# Patient Record
Sex: MALE | Race: WHITE | NOT HISPANIC OR LATINO | Employment: FULL TIME | URBAN - METROPOLITAN AREA
[De-identification: names, ages, dates, MRNs, and addresses within clinical notes are randomized per-mention and may not be internally consistent; named-entity substitution may affect disease eponyms.]

---

## 2022-04-27 DIAGNOSIS — R13.10 DYSPHAGIA: ICD-10-CM

## 2022-04-27 DIAGNOSIS — K21.9 GERD (GASTROESOPHAGEAL REFLUX DISEASE): Primary | ICD-10-CM

## 2022-04-27 DIAGNOSIS — Z87.19 HISTORY OF ESOPHAGEAL STRICTURE: ICD-10-CM

## 2022-07-05 ENCOUNTER — NURSE TRIAGE (OUTPATIENT)
Dept: ADMINISTRATIVE | Facility: CLINIC | Age: 67
End: 2022-07-05
Payer: MEDICARE

## 2022-07-05 NOTE — TELEPHONE ENCOUNTER
OCATHERINE Rn  NOT IN ,  IN [place].  Patient calling c/o Friday and slipped and fell out of care during inspection of car.  fell on left wrist.  Went to . 7/2/22 splint is on it.  Left wrist is swollen.  Got xrays done. states no fracture and applied splint, pain getting worse Sunday and Monday, very painful. And sandi.  Has xray.    Instructed to consult Dr. Au. Gave him Dr. Childers number.  Wrist has laceration. Pain level today 8/10  Care advise is to see  Today.  He only wants to see PCP.He is in [place], Advised to see Dr. Must go to  /ED  To see Doctor. Could not complete triage.  Patient only wants to Dr. Au.    And disconnected call.      Reason for Disposition   SEVERE pain (e.g., excruciating)    Additional Information   Negative: Major bleeding (actively dripping or spurting) that can't be stopped   Negative: Amputation or bone sticking through the skin   Negative: Sounds like a life-threatening emergency to the triager   Negative: Bullet, stabbed by knife or other serious penetrating wound   Negative: High pressure injection injury (e.g., from paint gun, usually work-related)   Negative: Injury looks like a broken bone or dislocated joint (crooked or deformed)   Negative: Skin is split open or gaping  (length > 1/2 inch or 12 mm)   Negative: Bleeding won't stop after 10 minutes of direct pressure (using correct technique)   Negative: Dirt in the wound and not removed after 15 minutes of scrubbing   Negative: Sounds like a serious injury to the triager   Negative: Numbness (loss of sensation) of finger(s)   Negative: Looks infected (e.g., spreading redness, pus, red streak)    Protocols used: ST TRAUMA - HAND AND WRIST-A-OH

## 2022-07-07 ENCOUNTER — OFFICE VISIT (OUTPATIENT)
Dept: ORTHOPEDICS | Facility: CLINIC | Age: 67
End: 2022-07-07
Payer: MEDICARE

## 2022-07-07 DIAGNOSIS — M25.532 ACUTE PAIN OF LEFT WRIST: ICD-10-CM

## 2022-07-07 DIAGNOSIS — S63.502A SPRAIN OF LEFT WRIST, INITIAL ENCOUNTER: Primary | ICD-10-CM

## 2022-07-07 PROCEDURE — 99203 PR OFFICE/OUTPT VISIT, NEW, LEVL III, 30-44 MIN: ICD-10-PCS | Mod: S$GLB,,, | Performed by: ORTHOPAEDIC SURGERY

## 2022-07-07 PROCEDURE — 99203 OFFICE O/P NEW LOW 30 MIN: CPT | Mod: S$GLB,,, | Performed by: ORTHOPAEDIC SURGERY

## 2022-07-07 RX ORDER — MORPHINE SULFATE 30 MG/1
TABLET ORAL
COMMUNITY
End: 2023-12-07

## 2022-07-07 RX ORDER — DIAZEPAM 10 MG/1
TABLET ORAL
COMMUNITY
Start: 2022-06-30

## 2022-07-07 RX ORDER — DICLOFENAC SODIUM 75 MG/1
75 TABLET, DELAYED RELEASE ORAL 2 TIMES DAILY
Qty: 60 TABLET | Refills: 3 | Status: SHIPPED | OUTPATIENT
Start: 2022-07-07 | End: 2023-12-07

## 2022-07-07 NOTE — PROGRESS NOTES
Subjective:      Patient ID: Lazaro Duenas is a 66 y.o. male.    Chief Complaint: Pain of the Left Wrist    HPI 66-year-old man who fell on his outstretched left arm 6 days ago.  He comes in complaining of dorsal and radial wrist pain.  He was seen at urgent care where x-rays were taken which were interpreted as normal.  He was placed in a thumb spica wrist splint.    Review of Systems   Constitutional: Negative for fever and weight loss.   Cardiovascular: Negative for chest pain and leg swelling.   Musculoskeletal: Positive for joint pain, joint swelling and stiffness. Negative for arthritis and muscle weakness.   Gastrointestinal: Negative for change in bowel habit.   Genitourinary: Negative for bladder incontinence and hematuria.   Neurological: Negative for focal weakness, numbness, paresthesias and sensory change.         Objective:      Patient has diffuse dorsal wrist and hand swelling.  He is tender with palpation of the distal radius.  He has normal sensation and pulses.  He has 45° of extension and 40° of flexion.    Ortho/SPM Exam            Assessment:       Encounter Diagnoses   Name Primary?    Sprain of left wrist, initial encounter     Acute pain of left wrist Yes          Plan:       Lazaro was seen today for pain.    Diagnoses and all orders for this visit:    Acute pain of left wrist  -     X-Ray Wrist Navicular Views Left; Future    Sprain of left wrist, initial encounter    X-rays including navicular views are taken today and they are unremarkable.  The wrist spica splint is appropriate treatment.  He is also placed on Voltaren return 10 days p.r.n.

## 2022-07-19 ENCOUNTER — OFFICE VISIT (OUTPATIENT)
Dept: ORTHOPEDICS | Facility: CLINIC | Age: 67
End: 2022-07-19
Payer: MEDICARE

## 2022-07-19 VITALS — BODY MASS INDEX: 26.13 KG/M2 | HEIGHT: 71 IN | WEIGHT: 186.63 LBS

## 2022-07-19 DIAGNOSIS — M25.532 ACUTE PAIN OF LEFT WRIST: Primary | ICD-10-CM

## 2022-07-19 PROCEDURE — 99212 PR OFFICE/OUTPT VISIT, EST, LEVL II, 10-19 MIN: ICD-10-PCS | Mod: S$GLB,,, | Performed by: ORTHOPAEDIC SURGERY

## 2022-07-19 PROCEDURE — 99212 OFFICE O/P EST SF 10 MIN: CPT | Mod: S$GLB,,, | Performed by: ORTHOPAEDIC SURGERY

## 2022-07-19 RX ORDER — MORPHINE SULFATE 30 MG/1
TABLET, FILM COATED, EXTENDED RELEASE ORAL
COMMUNITY
Start: 2022-07-05

## 2022-07-19 NOTE — PROGRESS NOTES
Subjective:      Patient ID: Lazaro Duenas is a 66 y.o. male.    Chief Complaint: Pain of the Left Wrist    HPI patient comes in today for follow-up for acute pain of the left wrist.  He is significantly improved on his anti-inflammatories.    ROS unchanged from prior visit      Objective:      Active and passive range of motion is normal.  The swelling has resolved.       Ortho/SPM Exam            Assessment:       Encounter Diagnosis   Name Primary?    Acute pain of left wrist Yes          Plan:       Lazaro was seen today for pain.    Diagnoses and all orders for this visit:    Acute pain of left wrist

## 2023-06-28 DIAGNOSIS — G89.29 CHRONIC PAIN: Primary | ICD-10-CM

## 2023-06-28 DIAGNOSIS — M54.16 RADICULOPATHY, LUMBAR REGION: ICD-10-CM

## 2023-06-28 DIAGNOSIS — G60.9 IDIOPATHIC PERIPHERAL NEUROPATHY: ICD-10-CM

## 2023-08-15 ENCOUNTER — TELEPHONE (OUTPATIENT)
Dept: PAIN MEDICINE | Facility: CLINIC | Age: 68
End: 2023-08-15
Payer: MEDICARE

## 2023-08-15 NOTE — TELEPHONE ENCOUNTER
----- Message from Rayna Abdullahi MD sent at 8/11/2023  1:05 PM CDT -----    Please call the patient and explain:     1) I do not prescribe concurrent opioids (morphine)and benzodiazepine (diazepam) medications.  2) We are an interventional pain clinic that works with our patients to find the safest, most effective solutions for their pain.     3) We employ a multidisciplinary approach that maximizes function and minimizes narcotic usage.     4) If he would like an assessment for non-narcotic management like injections, physical therapy, and non-opioid medications, we are more than happy to see evaluate for this.       Thank you,  Rayna Abdullahi MD  Interventional Pain Medicine

## 2023-09-07 ENCOUNTER — OFFICE VISIT (OUTPATIENT)
Dept: PAIN MEDICINE | Facility: CLINIC | Age: 68
End: 2023-09-07
Payer: MEDICARE

## 2023-09-07 VITALS
WEIGHT: 181 LBS | BODY MASS INDEX: 25.91 KG/M2 | DIASTOLIC BLOOD PRESSURE: 80 MMHG | SYSTOLIC BLOOD PRESSURE: 134 MMHG | OXYGEN SATURATION: 95 % | HEIGHT: 70 IN | HEART RATE: 76 BPM

## 2023-09-07 DIAGNOSIS — M25.651 DECREASED RANGE OF MOTION OF BOTH HIPS: ICD-10-CM

## 2023-09-07 DIAGNOSIS — M25.671 DECREASED RANGE OF MOTION OF BOTH ANKLES: ICD-10-CM

## 2023-09-07 DIAGNOSIS — M43.16 SPONDYLOLISTHESIS OF LUMBAR REGION: ICD-10-CM

## 2023-09-07 DIAGNOSIS — T85.192D FAILURE OF SPINAL CORD STIMULATOR, SUBSEQUENT ENCOUNTER: ICD-10-CM

## 2023-09-07 DIAGNOSIS — M43.06 LUMBAR SPONDYLOLYSIS: ICD-10-CM

## 2023-09-07 DIAGNOSIS — M96.1 FAILED BACK SYNDROME OF LUMBAR SPINE: ICD-10-CM

## 2023-09-07 DIAGNOSIS — M47.816 LUMBAR SPONDYLOSIS: ICD-10-CM

## 2023-09-07 DIAGNOSIS — M54.50 CHRONIC BILATERAL LOW BACK PAIN WITHOUT SCIATICA: Primary | ICD-10-CM

## 2023-09-07 DIAGNOSIS — M25.672 DECREASED RANGE OF MOTION OF BOTH ANKLES: ICD-10-CM

## 2023-09-07 DIAGNOSIS — M54.16 RADICULOPATHY, LUMBAR REGION: ICD-10-CM

## 2023-09-07 DIAGNOSIS — G60.9 IDIOPATHIC PERIPHERAL NEUROPATHY: ICD-10-CM

## 2023-09-07 DIAGNOSIS — M25.652 DECREASED RANGE OF MOTION OF BOTH HIPS: ICD-10-CM

## 2023-09-07 DIAGNOSIS — G89.29 CHRONIC BILATERAL LOW BACK PAIN WITHOUT SCIATICA: Primary | ICD-10-CM

## 2023-09-07 DIAGNOSIS — M53.86 DECREASED RANGE OF MOTION OF LUMBAR SPINE: ICD-10-CM

## 2023-09-07 DIAGNOSIS — G89.4 CHRONIC PAIN SYNDROME: ICD-10-CM

## 2023-09-07 PROBLEM — E78.00 HIGH CHOLESTEROL: Status: ACTIVE | Noted: 2023-09-07

## 2023-09-07 PROBLEM — M51.9 LUMBAR DISC DISEASE: Status: ACTIVE | Noted: 2017-10-09

## 2023-09-07 PROBLEM — M54.9 BACK PAIN: Status: ACTIVE | Noted: 2017-03-28

## 2023-09-07 PROBLEM — K21.9 GERD (GASTROESOPHAGEAL REFLUX DISEASE): Status: ACTIVE | Noted: 2023-09-07

## 2023-09-07 PROBLEM — C18.9 MALIGNANT NEOPLASM OF COLON: Status: ACTIVE | Noted: 2018-01-11

## 2023-09-07 PROBLEM — Z79.891 CHRONIC PRESCRIPTION OPIATE USE: Status: ACTIVE | Noted: 2019-08-06

## 2023-09-07 PROBLEM — H91.90 HEARING LOSS: Status: ACTIVE | Noted: 2023-09-07

## 2023-09-07 PROCEDURE — 99205 OFFICE O/P NEW HI 60 MIN: CPT | Mod: S$GLB,,, | Performed by: PHYSICAL MEDICINE & REHABILITATION

## 2023-09-07 PROCEDURE — 99205 PR OFFICE/OUTPT VISIT, NEW, LEVL V, 60-74 MIN: ICD-10-PCS | Mod: S$GLB,,, | Performed by: PHYSICAL MEDICINE & REHABILITATION

## 2023-09-07 RX ORDER — PANTOPRAZOLE SODIUM 40 MG/1
TABLET, DELAYED RELEASE ORAL
COMMUNITY
Start: 2023-08-18

## 2023-09-07 RX ORDER — ATORVASTATIN CALCIUM 20 MG/1
TABLET, FILM COATED ORAL
COMMUNITY
Start: 2023-09-01

## 2023-09-07 NOTE — PROGRESS NOTES
[facility]      Referring Provider: Billy Chapman Md  [address]   [facility] [address]    Chief Complaint:   Chief Complaint   Patient presents with    Back Pain    Foot Pain     bilateral       History of Present Illness: Lazaro Duenas is a 67 y.o. male referred by Dr. Billy Chapman for Back Pain and Bilateral Foot Pain.      Onset: 2002, back pain started without clear inciting event. In 2013 he was just driving down the road and pain acutely worsened as Dr. Au, Dr. Christy predicted. He had back surgery at L5-S1 in May 2013 and another surgery in October 2013 with Dr. Santiago. He had a spinal cord stimulator (Biolase) placed with Dr. Luong in [place] in 2016 and that didn't help.   Location: Back bilaterally and diffusely   Radiation: Does not radiate down the leg, but gets pain in both feet in the balls of his feet.   Timing: constant  Quality: Burning, Hot, Tingling, Numb, Pins/Needles, Electric, and Stabbing  Exacerbating Factors: nothing in particular  Alleviating Factors: nothing  Associated Symptoms: he has weakness in both legs. He has numbness in both legs. He denies night fever/night sweats, urinary incontinence/change in function, bowel incontinence/change in function, and unexplained weight loss    Severity: Currently: 10/10   Typical Range: 10-10/10     Exacerbation: 10/10     P = 10  E = 10  G = 9  Baseline PEG Score = 9.67  Current PEG Score: 9.67    Opioid Risk Score         Value Time User    Opioid Risk Score  0 9/7/2023  2:00 PM Wilda Sanchez MA             Previous Interventions:  - Spinal Cord Stimulator ([employer])    Previous Therapies:  PT/OT: yes   Relevant Surgery: yes   - L5-S1 surgery and a 2nd surgery within same year in 2013 with Dr. Santiago  Previous Medications:   - NSAIDS:   - Muscle Relaxants:  valium  - TCAs:   - SNRIs: cymbalta  - Topicals:   - Anticonvulsants: gabapentin. lyrica   - Opioids:  "morphine.    Current Pain Medications:  Morphine ER 30 mg TID      Blood Thinners: none    Full Medication List:    Current Outpatient Medications:     atorvastatin (LIPITOR) 20 MG tablet, , Disp: , Rfl:     diazePAM (VALIUM) 10 MG Tab, , Disp: , Rfl:     morphine (MS CONTIN) 30 MG 12 hr tablet, TAKE ONE (1) TABLET(S) BY MOUTH THREE TIMES A DAY., Disp: , Rfl:     morphine (MSIR) 30 MG tablet, , Disp: , Rfl:     pantoprazole (PROTONIX) 40 MG tablet, , Disp: , Rfl:     diclofenac (VOLTAREN) 75 MG EC tablet, Take 1 tablet (75 mg total) by mouth 2 (two) times daily. (Patient not taking: Reported on 9/7/2023), Disp: 60 tablet, Rfl: 3     Review of Systems: See HPI    Allergies:  Penicillins     Medical History:   has a past medical history of Cancer (2002), GERD (gastroesophageal reflux disease), and Hyperlipidemia.    Surgical History:   has a past surgical history that includes Foot surgery (Left) and Back surgery (10/2013).    Family History:  He was adopted. Family history is unknown by patient.    Social History:   reports that he quit smoking about 23 years ago. His smoking use included cigarettes and cigars. He started smoking about 44 years ago. He has a 21.0 pack-year smoking history. He has never used smokeless tobacco. He reports current drug use. Drug: Marijuana.    Physical Exam:  /80   Pulse 76   Ht 5' 10" (1.778 m)   Wt 82.1 kg (181 lb)   SpO2 95%   BMI 25.97 kg/m²   GEN: No acute distress. Calm, comfortable  HENT: Normocephalic, atraumatic, moist mucous membranes  EYE: Anicteric sclera, non-injected.   CV: Non-diaphoretic. Regular Rate. Radial Pulses 2+.  RESP: Breathing comfortably. Chest expansion symmetric.  EXT: No clubbing, cyanosis.   SKIN: Warm, & dry to palpation. No visible rashes or lesions of exposed skin.   PSYCH: Pleasant mood and appropriate affect. Recent and remote memory intact.   GAIT: Independent, normal ambulation  Lumbar Spine Exam:       Inspection: No erythema, bruising. " "      Palpation: (-) TTP of lumbar paraspinals or SIJ       ROM:  Limited in flexion, extension, lateral bending.       (+) Facet loading bilaterally      (-) Straight Leg Raise bilaterally      (-) PENNY bilaterally  Hip Exam:      Inspection: No gross deformity or apparent leg length discrepancy      Palpation: No TTP to bilateral greater trochanteric bursas b/l.       ROM:  (+)  limitation in internal rotation, external rotation b/l  Neurologic Exam:     Alert. Speech is fluent and appropriate.     Strength:  5/5 throughout bilateral lower extremities     Sensation:  Grossly intact to light touch in bilateral lower extremities     Reflexes: 2+ in b/l patella, achilles     Tone: No abnormality appreciated in bilateral lower extremities     No Clonus     Downgoing toes on plantar stimulation     (-) [place] bilaterally      Imaging:  - X-ray lumbar spine 9/7/23:    - X-ray thoracic spine 9/7/23:    - X-ray hips b/l 9/7/23:    - CT Myelogram 2014:      Labs:  BMP  No results found for: "NA", "K", "CL", "CO2", "BUN", "CREATININE", "CALCIUM", "ANIONGAP", "EGFRNORACEVR"  No results found for: "ALT", "AST", "GGT", "ALKPHOS", "BILITOT"  No results found for: "PLT"    Assessment:  Lazaro Duenas is a 67 y.o. male with the following diagnoses based on history, exam, and imaging:    Problem List Items Addressed This Visit    None  Visit Diagnoses       Chronic bilateral low back pain without sciatica    -  Primary    Relevant Orders    X-Ray Lumbar Complete Including Flex And Ext    Chronic pain        Idiopathic peripheral neuropathy        Relevant Orders    EMG W/ ULTRASOUND AND NERVE CONDUCTION TEST 2 Extremities    Radiculopathy, lumbar region        Relevant Orders    EMG W/ ULTRASOUND AND NERVE CONDUCTION TEST 2 Extremities    Lumbar spondylosis        Relevant Orders    X-Ray Lumbar Complete Including Flex And Ext    Spondylolisthesis of lumbar region        Relevant Orders    X-Ray Lumbar Complete Including Flex " And Ext    Lumbar spondylolysis        Relevant Orders    X-Ray Lumbar Complete Including Flex And Ext    Decreased range of motion of both hips        Relevant Orders    X-Ray Hips Bilateral 2 View Incl AP Pelvis    Ambulatory referral/consult to Physical/Occupational Therapy    Decreased range of motion of lumbar spine        Relevant Orders    X-Ray Lumbar Complete Including Flex And Ext    Ambulatory referral/consult to Physical/Occupational Therapy    Failed back syndrome of lumbar spine        Relevant Orders    X-Ray Lumbar Complete Including Flex And Ext    Failure of spinal cord stimulator, subsequent encounter        Relevant Orders    X-Ray Thoracic Spine AP Lateral    X-Ray Lumbar Complete Including Flex And Ext    Decreased range of motion of both ankles        Relevant Orders    Ambulatory referral/consult to Physical/Occupational Therapy            This is a pleasant 67 y.o. gentleman presenting with:     - Chronic bilateral low back pain and failed back surgery: Prior L5-S1 fusion. S/p [facility] SCS that never provided benefit.   - Decreased hip ROM  - Bilateral feet pain: Patient notes that he was told the feet pain is due to his back, but I am uncertain at this time. He has decreased ADF ROM which will put increased forces through the foot that may improve as he improves his ankle ROM  - Chronic opiate use:  I explained to the patient that I do not prescribe concurrent benzodiazepine and opioid medications.  Despite his concurrent benzodiazepine medications, I am not certain that the opioids are a good option as he does not seem to be getting much of any benefit from knee extended release morphine despite a daily morphine mEq of 90 mg.  He reports 10/10 pain constantly, so I do not see any current benefit with this medication and he actually may be better off slowly tapering with his current provider.  - Comorbidities:  History of colon cancer.  GERD.  Questionable history of  diabetes.    Treatment Plan:   - PT/OT/HEP:  Referred to physical therapy to work on lumbar, hip and ankle range of motion as well as core strengthening in hopes of improving his back pain.  It is quite alarming that he is on this level of chronic opioids despite never doing physical therapy for his back pain. Discussed benefits of exercise for pain.   - Procedures: None at this time. Need further work   - could consider switching spinal cord stimulator system from [employer] to [employer] as this does have some better evidence, especially in the setting of peripheral neuropathy.  - Medications:   - No changes recommended at this time.  - I do think it would be reasonable to retrial Cymbalta in the future.  He notes side effect of tinnitus with previous initiation of Cymbalta, but this did not improve with discontinuation and I think it was correlated but not the causative factor of his tinnitus.  - Imaging: Reviewed.  X-ray lumbar and thoracic spine to assess stimulator lead position.  X-ray bilateral hips.  - Labs: Reviewed.  Medications are appropriately dosed for current hepatorenal function.  - order EMG/nerve conduction study to assess for peripheral neuropathy versus lumbosacral radiculopathy.  - request records from Dr. Santiago and Dr. Godfrey    Follow Up: RTC in 2-3 months or sooner as needed    I spent greater than 60 minutes in total in todays visit including face-to-face time with the patient, and time reviewing records/imaging/labs, and documenting.       Rayna Abdullahi M.D.  Interventional Pain Medicine / Physical Medicine & Rehabilitation

## 2023-09-07 NOTE — Clinical Note
This is a referring provider that it does not auto fax the note to. Can we print and fax this to Dr. Chapman please and get his info corrected in the system?

## 2023-09-14 NOTE — PROGRESS NOTES
Outside Record Documentation: Records obtained from [facility]    After evaluation, Dr. Santiago did not feel his feet pains were related to his back pain. Dr. Santiago notes discussing case with Dr. Luong, neurosurgeon in [place], who also did not feel his pain was radicular in nature.     Pain Procedures:  - diagnostic block of medial sural nerve at tarsal tunnel did not provide any relief.   - Local blocks to bottom of feet were not effective.     Surgeries for Pain:  - Right L5-S1 [employer] microdiskectomy with Dr. Santiago in 2013.     Outside Imaging Records:  - CT myelogram not noted to demonstrate any pathology to account for his foot drop. However, I am unable to read any of the imaging reports due to blurred scans.      - EMG/NCS 2013 with Dr. Parsons: Evidence of diabetic sensorimotor polyneuropathy and chronic denervations particularly along L5-S1 on right side

## 2023-10-26 ENCOUNTER — DOCUMENTATION ONLY (OUTPATIENT)
Dept: PAIN MEDICINE | Facility: CLINIC | Age: 68
End: 2023-10-26
Payer: MEDICARE

## 2023-10-26 NOTE — PROGRESS NOTES
"Outside Record Documentation: Records obtained from Dr. Navarro Luong MD    He would get constipation with pain medications, managed with movantik 25 mg PO daily initially then amitiza and relistor which did not work well. He was planning on getting SCS, but then developed foot drop and EMG showed chronic L5/S1 radiculopathy and foot drop was attributed to that. He then had SCS implantation and it was noted that "he should be able to get off of his opioids over the next few weeks" in 2016. He started noting "electrical shock type jerking" while sleeping and this seemed to worsen his pain. EEG was obtained 9/27/16 which reported findings suggestive of a focal lesion in the left frontal central temporal region, but did not explain the "jerking:movements at night in bed. Brain MRI was ordered and he was referred to Dr. Trell Manuel, Neurosurgery, for foot pain. He was started on requip for restless legs. Eventually determined SCS with [facility] was not a success.     For some reason, this clinic was ordering UDS, though it noted medications were being managed by Dr. Willy Milligan.     Pain Medications:  - Tizanidine 4 mg  - Valium 10 mg (noted uses valium to augment the dilaudid)  - Clonazepam 0.5 mg BID   - Elavil 25 mg   - Dilaudid HCl ER 8 mg   - Duloxetine 60 mg   - Morphine Sulfate ER 30 mg     Pain Procedures:  - 4/22/15: Pulsed RF of erickson's neuroma on right - no significant relief  - Chemical cautery with methylene blue & dextrose on right with some relief, but limited  - 2/24/16: [facility] SCS trial w/ 45-50% relief, he was unsure about implant, but noted that potentially complicated by GI discomfort due to constipation.   - 5/17/16: SCS Implant, [facility]  - 9/29/16: Right S1 TF CYRUS  - 11/6/18: Selective nerve root block rigth L5/S1        "

## 2023-12-07 ENCOUNTER — OFFICE VISIT (OUTPATIENT)
Dept: PAIN MEDICINE | Facility: CLINIC | Age: 68
End: 2023-12-07
Payer: MEDICARE

## 2023-12-07 VITALS
OXYGEN SATURATION: 95 % | HEIGHT: 70 IN | HEART RATE: 72 BPM | DIASTOLIC BLOOD PRESSURE: 78 MMHG | SYSTOLIC BLOOD PRESSURE: 127 MMHG | WEIGHT: 183.69 LBS | BODY MASS INDEX: 26.3 KG/M2 | RESPIRATION RATE: 18 BRPM

## 2023-12-07 DIAGNOSIS — G60.9 IDIOPATHIC PERIPHERAL NEUROPATHY: ICD-10-CM

## 2023-12-07 DIAGNOSIS — M96.1 FAILED BACK SYNDROME OF LUMBAR SPINE: ICD-10-CM

## 2023-12-07 DIAGNOSIS — G89.4 CHRONIC PAIN SYNDROME: Primary | ICD-10-CM

## 2023-12-07 DIAGNOSIS — M54.16 RADICULOPATHY, LUMBAR REGION: ICD-10-CM

## 2023-12-07 DIAGNOSIS — T85.192D FAILURE OF SPINAL CORD STIMULATOR, SUBSEQUENT ENCOUNTER: ICD-10-CM

## 2023-12-07 PROCEDURE — 99215 PR OFFICE/OUTPT VISIT, EST, LEVL V, 40-54 MIN: ICD-10-PCS | Mod: S$GLB,,, | Performed by: PHYSICAL MEDICINE & REHABILITATION

## 2023-12-07 PROCEDURE — 99215 OFFICE O/P EST HI 40 MIN: CPT | Mod: S$GLB,,, | Performed by: PHYSICAL MEDICINE & REHABILITATION

## 2023-12-07 RX ORDER — MIRTAZAPINE 15 MG/1
TABLET, FILM COATED ORAL
COMMUNITY
Start: 2023-06-26

## 2023-12-07 NOTE — PROGRESS NOTES
[facility]      Chief Complaint:   Chief Complaint   Patient presents with    Back Pain       History of Present Illness: Lazaro Duenas is a 68 y.o. male referred by Dr. Billy Chapman for Back Pain and Bilateral Foot Pain.      Onset: 2002, back pain started without clear inciting event. In 2013 he was just driving down the road and pain acutely worsened as Dr. Au, Dr. Christy predicted. He had back surgery at L5-S1 in May 2013 and another surgery in October 2013 with Dr. Santiago. He had a spinal cord stimulator (DFMSim) placed with Dr. Luong in [place] in 2016 and that didn't help.   Location: Back bilaterally and diffusely   Radiation: Does not radiate down the leg, but gets pain in both feet in the balls of his feet.   Timing: constant  Quality: Burning, Hot, Tingling, Numb, Pins/Needles, Electric, and Stabbing  Exacerbating Factors: nothing in particular  Alleviating Factors: nothing  Associated Symptoms: he has weakness in both legs. He has numbness in both legs. He denies night fever/night sweats, urinary incontinence/change in function, bowel incontinence/change in function, and unexplained weight loss    Severity: Currently: 10/10   Typical Range: 10-10/10     Exacerbation: 10/10     P = 10  E = 10  G = 9  Baseline PEG Score = 9.67    Interval History (12/07/2023):  Lazaro Duenas returns today for follow up.  At the last clinic visit, referred to physical therapy and gathered records.    Physical therapy provided 0% relief, but he did not participate.     Currently, the back and leg down to feet pain is worse.      Patient is c/o neuropathy is worst and can't seem to get any relief.    He takes MS Contin 30 mg daily and not helping. He decreased on his own from 90 mg.     Current Pain Scales:  Current: 8/10              Typical Range: 8-10/10     Current PEG Score: 10    Opioid Risk Score         Value Time User    Opioid Risk Score  0 9/7/2023  2:00 PM Wilda Sanchez MA              Previous Interventions:  - 4/22/15: Pulsed RF of erickson's neuroma on right - no significant relief  - Chemical cautery with methylene blue & dextrose on right with some relief, but limited  - 2/24/16: [place] SCS trial w/ 45-50% relief, he was unsure about implant, but noted that potentially complicated by GI discomfort due to constipation.   - 5/17/16: SCS Implant, [place] [facility]  - 9/29/16: Right S1 TF CYRUS  - 11/6/18: Selective nerve root block rigth L5/S1    Previous Therapies:  PT/OT: yes   Relevant Surgery: yes   - L5-S1 surgery and a 2nd surgery within same year in 2013 with Dr. Santiago  Previous Medications:   - NSAIDS:   - Muscle Relaxants:  valium  - TCAs:   - SNRIs: cymbalta  - Topicals:   - Anticonvulsants: gabapentin. lyrica   - Opioids: morphine.    Current Pain Medications:  Morphine ER 30 mg daily      Blood Thinners: none    Full Medication List:    Current Outpatient Medications:     atorvastatin (LIPITOR) 20 MG tablet, , Disp: , Rfl:     diazePAM (VALIUM) 10 MG Tab, , Disp: , Rfl:     morphine (MS CONTIN) 30 MG 12 hr tablet, TAKE ONE (1) TABLET(S) BY MOUTH THREE TIMES A DAY., Disp: , Rfl:     pantoprazole (PROTONIX) 40 MG tablet, , Disp: , Rfl:     REMERON 15 mg tablet, , Disp: , Rfl:     morphine (MSIR) 30 MG tablet, , Disp: , Rfl:      Review of Systems: See HPI    Allergies:  Penicillins     Medical History:   has a past medical history of Cancer (2002), GERD (gastroesophageal reflux disease), and Hyperlipidemia.    Surgical History:   has a past surgical history that includes Foot surgery (Left) and Back surgery (10/2013).    Family History:  He was adopted. Family history is unknown by patient.    Social History:   reports that he quit smoking about 23 years ago. His smoking use included cigarettes and cigars. He started smoking about 44 years ago. He has a 21.0 pack-year smoking history. He has never used smokeless tobacco. He reports current drug use. Drug: Marijuana.    Physical  "Exam:  /78   Pulse 72   Resp 18   Ht 5' 10" (1.778 m)   Wt 83.3 kg (183 lb 11.2 oz)   SpO2 95%   BMI 26.36 kg/m²   GEN: No acute distress. Calm, comfortable  HENT: Normocephalic, atraumatic, moist mucous membranes  EYE: Anicteric sclera, non-injected.   CV: Non-diaphoretic. Regular Rate. Radial Pulses 2+.  RESP: Breathing comfortably. Chest expansion symmetric.  EXT: No clubbing, cyanosis.   SKIN: Warm, & dry to palpation. No visible rashes or lesions of exposed skin.   PSYCH: Pleasant mood and appropriate affect. Recent and remote memory intact.   GAIT: Independent, normal ambulation  Lumbar Spine Exam:       Inspection: No erythema, bruising.       Palpation: (-) TTP of lumbar paraspinals or SIJ       ROM:  Limited in flexion, extension, lateral bending.       (+) Facet loading bilaterally      (-) Straight Leg Raise bilaterally      (-) PENNY bilaterally  Hip Exam:      Inspection: No gross deformity or apparent leg length discrepancy      Palpation: No TTP to bilateral greater trochanteric bursas b/l.       ROM:  (+)  limitation in internal rotation, external rotation b/l  Neurologic Exam:     Alert. Speech is fluent and appropriate.     Strength:  5/5 throughout bilateral lower extremities     Sensation:  Grossly intact to light touch in bilateral lower extremities     Reflexes: 2+ in b/l patella, achilles     Tone: No abnormality appreciated in bilateral lower extremities     No Clonus     Downgoing toes on plantar stimulation     (-) [place] bilaterally      Imaging:  - X-ray lumbar spine 9/7/23:  No fracture or dislocation is demonstrated. Moderate osteoarthritis involving both hips and SI joints.  Postoperative change lower lumbar spine     - X-ray thoracic spine 9/7/23:  There is no acute fracture or compression deformity. Bone mineralization is normal. No aggressive lytic or blastic lesion seen.  Alignment is within normal limits.  Intervertebral disc heights are relatively well preserved with " "multilevel degenerative endplate osteophytosis noted, most prominent in the mid to lower thoracic levels.   Neuro stimulatory leads terminate at the T8-T9 level.  Visualized soft tissues are unremarkable.       - X-ray hips b/l 9/7/23:   No fracture or dislocation is demonstrated. Moderate osteoarthritis involving both hips and SI joints.  Postoperative change lower lumbar spine.       - CT Myelogram 2014:      Labs:  BMP  No results found for: "NA", "K", "CL", "CO2", "BUN", "CREATININE", "CALCIUM", "ANIONGAP", "EGFRNORACEVR"  No results found for: "ALT", "AST", "GGT", "ALKPHOS", "BILITOT"  No results found for: "PLT"    Assessment:  Lazaro Duenas is a 68 y.o. male with the following diagnoses based on history, exam, and imaging:    Problem List Items Addressed This Visit          Neuro    Chronic pain syndrome - Primary     Other Visit Diagnoses       Idiopathic peripheral neuropathy        Radiculopathy, lumbar region        Failed back syndrome of lumbar spine        Failure of spinal cord stimulator, subsequent encounter                  This is a pleasant 68 y.o. gentleman presenting with:     - Chronic bilateral low back pain and failed back surgery: Prior L5-S1 fusion. S/p [place] [facility] SCS that never provided benefit.   - Decreased hip ROM  - Bilateral feet pain: Suspect pain primarily due to peripheral neuropathy, and he has decreased ADF ROM which will put increased forces through the foot that may improve as he improves his ankle ROM  - Chronic opiate use:  I explained to the patient that I do not prescribe concurrent benzodiazepine and opioid medications.  Despite his concurrent benzodiazepine medications, I am not certain that the opioids are a good option as he does not seem to be getting much of any benefit from knee extended release morphine despite a daily morphine mEq of 90 mg.  He reports 10/10 pain constantly, so I do not see any current benefit with this medication and he actually may be better " off slowly tapering with his current provider.   - Now down to 30 mg daily. Recommended considering switching doses and continue tapering with his current pain physician.   - Comorbidities:  History of colon cancer.  GERD.  Questionable history of diabetes.    Treatment Plan:   - PT/OT/HEP:  Referred prior to physical therapy to work on lumbar, hip and ankle range of motion as well as core strengthening in hopes of improving his back pain.  It is quite alarming that he is on this level of chronic opioids despite never doing physical therapy for his back pain.   - Long discussion about the benefits of exercise for pain. Recommended pilates, swimming, lino chi, or yoga as potential options that would not increase standing on his feet  - Procedures: He is not interested at this time.    - could consider switching spinal cord stimulator system from [employer] to [employer] as this does have some better evidence, especially in the setting of peripheral neuropathy.   - Consider DRG  - Medications:   - No changes recommended at this time.  - I do think it would be reasonable to retrial Cymbalta in the future.  He notes side effect of tinnitus with previous initiation of Cymbalta, but this did not improve with discontinuation and I think it was correlated but not the causative factor of his tinnitus.  - Imaging: Reviewed.    - Labs: Reviewed.  Medications are appropriately dosed for current hepatorenal function.  - Reviewed records from Dr. Santiago and Dr. Godfrey    Follow Up: RTC as needed    I spent greater than 40 minutes in total in todays visit including face-to-face time with the patient, and time reviewing records/imaging/labs, and documenting.       Rayna Abdullahi M.D.  Interventional Pain Medicine / Physical Medicine & Rehabilitation

## 2024-03-12 ENCOUNTER — OFFICE VISIT (OUTPATIENT)
Dept: PAIN MEDICINE | Facility: CLINIC | Age: 69
End: 2024-03-12
Payer: MEDICARE

## 2024-03-12 VITALS
DIASTOLIC BLOOD PRESSURE: 88 MMHG | HEART RATE: 106 BPM | BODY MASS INDEX: 23.66 KG/M2 | WEIGHT: 165.31 LBS | OXYGEN SATURATION: 96 % | HEIGHT: 70 IN | SYSTOLIC BLOOD PRESSURE: 153 MMHG

## 2024-03-12 DIAGNOSIS — T85.192D FAILURE OF SPINAL CORD STIMULATOR, SUBSEQUENT ENCOUNTER: ICD-10-CM

## 2024-03-12 DIAGNOSIS — G89.4 CHRONIC PAIN SYNDROME: Primary | ICD-10-CM

## 2024-03-12 DIAGNOSIS — Z79.891 LONG TERM (CURRENT) USE OF OPIATE ANALGESIC: ICD-10-CM

## 2024-03-12 DIAGNOSIS — G60.9 IDIOPATHIC PERIPHERAL NEUROPATHY: ICD-10-CM

## 2024-03-12 PROCEDURE — 99214 OFFICE O/P EST MOD 30 MIN: CPT | Mod: S$GLB,,, | Performed by: PHYSICAL MEDICINE & REHABILITATION

## 2024-03-12 NOTE — PROGRESS NOTES
"[facility]      Chief Complaint:   Chief Complaint   Patient presents with    Foot Pain     Bilateral (right) more painful       History of Present Illness: Lazaro Duenas is a 68 y.o. male referred by Dr. Billy Chapman for Back Pain and Bilateral Foot Pain.      Onset: 2002, back pain started without clear inciting event. In 2013 he was just driving down the road and pain acutely worsened as Dr. Au, Dr. Christy predicted. He had back surgery at L5-S1 in May 2013 and another surgery in October 2013 with Dr. Santiago. He had a spinal cord stimulator ([employer]) placed with Dr. Luong in [place] in 2016 and that didn't help.   Location: Back bilaterally and diffusely   Radiation: Does not radiate down the leg, but gets pain in both feet in the balls of his feet.   Timing: constant  Quality: Burning, Hot, Tingling, Numb, Pins/Needles, Electric, and Stabbing  Exacerbating Factors: nothing in particular  Alleviating Factors: nothing  Associated Symptoms: he has weakness in both legs. He has numbness in both legs. He denies night fever/night sweats, urinary incontinence/change in function, bowel incontinence/change in function, and unexplained weight loss    Severity: Currently: 10/10   Typical Range: 10-10/10     Exacerbation: 10/10     P = 10  E = 10  G = 9  Baseline PEG Score = 9.67    Interval History (03/12/2024):  Lazaro Duenas returns today for follow up.  At the last clinic visit,  Referred prior to physical therapy to work on lumbar, hip and ankle range of motion as well as core strengthening     Currently, the bilateral foot pain is worse. He cannot sleep "without knocking myself out".   He states he did attend PT and enjoyed it.      Current Pain Scales:  Current: 10/10              Typical Range: 8-10/10     Current PEG Score: 10    Opioid Risk Score         Value Time User    Opioid Risk Score  0 9/7/2023  2:00 PM Wilda Sanchez MA             Previous Interventions:  - 4/22/15: " "Pulsed RF of erickson's neuroma on right - no significant relief  - Chemical cautery with methylene blue & dextrose on right with some relief, but limited  - 2/24/16: [place] SCS trial w/ 45-50% relief, he was unsure about implant, but noted that potentially complicated by GI discomfort due to constipation.   - 5/17/16: SCS Implant, [facility]  - 9/29/16: Right S1 TF CYRUS  - 11/6/18: Selective nerve root block rigth L5/S1    Previous Therapies:  PT/OT: yes   Relevant Surgery: yes   - L5-S1 surgery and a 2nd surgery within same year in 2013 with Dr. Santiago  Previous Medications:   - NSAIDS:   - Muscle Relaxants:  valium  - TCAs:   - SNRIs: cymbalta  - Topicals:   - Anticonvulsants: gabapentin. lyrica   - Opioids: morphine.    Current Pain Medications:  Morphine ER 30 mg daily      Blood Thinners: none    Full Medication List:    Current Outpatient Medications:     atorvastatin (LIPITOR) 20 MG tablet, , Disp: , Rfl:     diazePAM (VALIUM) 10 MG Tab, , Disp: , Rfl:     morphine (MS CONTIN) 30 MG 12 hr tablet, TAKE ONE (1) TABLET(S) BY MOUTH THREE TIMES A DAY., Disp: , Rfl:     pantoprazole (PROTONIX) 40 MG tablet, , Disp: , Rfl:     REMERON 15 mg tablet, , Disp: , Rfl:      Review of Systems: See HPI    Allergies:  Penicillins     Medical History:   has a past medical history of Cancer (2002), GERD (gastroesophageal reflux disease), and Hyperlipidemia.    Surgical History:   has a past surgical history that includes Foot surgery (Left) and Back surgery (10/2013).    Family History:  He was adopted. Family history is unknown by patient.    Social History:   reports that he quit smoking about 24 years ago. His smoking use included cigarettes and cigars. He started smoking about 45 years ago. He has a 21.0 pack-year smoking history. He has never used smokeless tobacco. He reports current drug use. Drug: Marijuana.    Physical Exam:  BP (!) 153/88   Pulse 106   Ht 5' 10" (1.778 m)   Wt 75 kg (165 lb 4.8 oz)   SpO2 96%   BMI " 23.72 kg/m²   GEN: Patient appears uncomfortable and in pain. Grimacing often.   HENT: Normocephalic, atraumatic, moist mucous membranes  EYE: Anicteric sclera, non-injected.   CV: Non-diaphoretic. Regular Rate. Radial Pulses 2+.  RESP: Breathing comfortably. Chest expansion symmetric.  EXT: No clubbing, cyanosis.   SKIN: Warm, & dry to palpation. No visible rashes or lesions of exposed skin.   PSYCH: Pleasant mood and appropriate affect. Recent and remote memory intact.   GAIT: Independent, normal ambulation  Lumbar Spine Exam:       Inspection: No erythema, bruising.       Palpation: (-) TTP of lumbar paraspinals or SIJ       ROM:  Limited in flexion, extension, lateral bending.       (+) Facet loading bilaterally      (-) Straight Leg Raise bilaterally      (-) PENNY bilaterally  Hip Exam:      Inspection: No gross deformity or apparent leg length discrepancy      Palpation: No TTP to bilateral greater trochanteric bursas b/l.       ROM:  (+)  limitation in internal rotation, external rotation b/l  Neurologic Exam:     Alert. Speech is fluent and appropriate.     Strength:  5/5 throughout bilateral lower extremities     Sensation:  Grossly intact to light touch in bilateral lower extremities     Reflexes: 2+ in b/l patella, achilles     Tone: No abnormality appreciated in bilateral lower extremities     No Clonus     Downgoing toes on plantar stimulation     (-) [place] bilaterally      Imaging:  - X-ray lumbar spine 9/7/23:  No fracture or dislocation is demonstrated. Moderate osteoarthritis involving both hips and SI joints.  Postoperative change lower lumbar spine     - X-ray thoracic spine 9/7/23:  There is no acute fracture or compression deformity. Bone mineralization is normal. No aggressive lytic or blastic lesion seen.  Alignment is within normal limits.  Intervertebral disc heights are relatively well preserved with multilevel degenerative endplate osteophytosis noted, most prominent in the mid to lower  "thoracic levels.   Neuro stimulatory leads terminate at the T8-T9 level.  Visualized soft tissues are unremarkable.       - X-ray hips b/l 9/7/23:   No fracture or dislocation is demonstrated. Moderate osteoarthritis involving both hips and SI joints.  Postoperative change lower lumbar spine.       - CT Myelogram 2014:      Labs:  BMP  No results found for: "NA", "K", "CL", "CO2", "BUN", "CREATININE", "CALCIUM", "ANIONGAP", "EGFRNORACEVR"  No results found for: "ALT", "AST", "GGT", "ALKPHOS", "BILITOT"  No results found for: "PLT"    Assessment:  Lazaro Duenas is a 68 y.o. male with the following diagnoses based on history, exam, and imaging:    Problem List Items Addressed This Visit          Neuro    Chronic pain syndrome - Primary     Other Visit Diagnoses       Idiopathic peripheral neuropathy        Failure of spinal cord stimulator, subsequent encounter        Long term (current) use of opiate analgesic                    This is a pleasant 68 y.o. gentleman presenting with:     - Chronic bilateral low back pain and failed back surgery: Prior L5-S1 fusion. S/p [place] [facility] SCS that never provided benefit.   - Decreased hip ROM  - Bilateral feet pain: Suspect pain primarily due to peripheral neuropathy, and he has decreased ADF ROM which will put increased forces through the foot that may improve as he improves his ankle ROM  - Chronic opiate use:  I explained to the patient that I do not prescribe concurrent benzodiazepine and opioid medications.  Despite his concurrent benzodiazepine medications, I am not certain that the opioids are a good option as he does not seem to be getting much of any benefit from extended release morphine despite a daily morphine mEq of 90 mg.  He reports 10/10 pain constantly, so I do not see any current benefit with this medication and he actually may be better off slowly tapering with his current provider.   - Now down to 30 mg daily. Recommended considering switching to lower " dose tablets and continue tapering with his current pain physician.   - Comorbidities:  History of colon cancer.  GERD.  Questionable history of diabetes.    Treatment Plan:   - PT/OT/HEP:  Referred prior to physical therapy to work on lumbar, hip and ankle range of motion as well as core strengthening in hopes of improving his back and leg pains.  It is quite alarming that he is on this level of chronic opioids despite never doing physical therapy for his back pain.   - Have had a long discussion about the benefits of exercise for pain. Recommended pilates, swimming, lino chi, or yoga as potential options that would not increase standing on his feet  - Procedures: Schedule bilateral lumbar sympathetic block with local/MAC (ketamine).    - could consider switching spinal cord stimulator system from [employer] to [employer] as this does have some better evidence, especially in the setting of peripheral neuropathy.   - Consider DRG  - Medications:   - No changes recommended at this time.  - I do think it would be reasonable to retrial Cymbalta in the future.  He notes side effect of tinnitus with previous initiation of Cymbalta, but this did not improve with discontinuation and I think it was correlated but not the causative factor of his tinnitus.  - Imaging: Reviewed.    - Labs: Reviewed.  Medications are appropriately dosed for current hepatorenal function.        Follow Up: RTC 1 week after sympathetic block as needed      Rayna Abdullahi M.D.  Interventional Pain Medicine / Physical Medicine & Rehabilitation

## 2024-08-08 ENCOUNTER — OUTSIDE PLACE OF SERVICE (OUTPATIENT)
Dept: INTERVENTIONAL RADIOLOGY/VASCULAR | Facility: CLINIC | Age: 69
End: 2024-08-08
Payer: MEDICARE

## 2024-10-24 ENCOUNTER — PATIENT MESSAGE (OUTPATIENT)
Dept: RESEARCH | Facility: HOSPITAL | Age: 69
End: 2024-10-24
Payer: MEDICARE